# Patient Record
Sex: FEMALE | Race: OTHER | URBAN - METROPOLITAN AREA
[De-identification: names, ages, dates, MRNs, and addresses within clinical notes are randomized per-mention and may not be internally consistent; named-entity substitution may affect disease eponyms.]

---

## 2017-12-08 ENCOUNTER — EMERGENCY (EMERGENCY)
Facility: HOSPITAL | Age: 22
LOS: 0 days | Discharge: HOME | End: 2017-12-08
Attending: EMERGENCY MEDICINE
Payer: SELF-PAY

## 2017-12-08 VITALS
HEIGHT: 63 IN | WEIGHT: 139.99 LBS | DIASTOLIC BLOOD PRESSURE: 81 MMHG | OXYGEN SATURATION: 98 % | RESPIRATION RATE: 15 BRPM | HEART RATE: 99 BPM | TEMPERATURE: 99 F | SYSTOLIC BLOOD PRESSURE: 144 MMHG

## 2017-12-08 DIAGNOSIS — R50.9 FEVER, UNSPECIFIED: ICD-10-CM

## 2017-12-08 DIAGNOSIS — R09.81 NASAL CONGESTION: ICD-10-CM

## 2017-12-08 DIAGNOSIS — R05 COUGH: ICD-10-CM

## 2017-12-08 DIAGNOSIS — H66.92 OTITIS MEDIA, UNSPECIFIED, LEFT EAR: ICD-10-CM

## 2017-12-08 DIAGNOSIS — J06.9 ACUTE UPPER RESPIRATORY INFECTION, UNSPECIFIED: ICD-10-CM

## 2017-12-08 PROCEDURE — 99283 EMERGENCY DEPT VISIT LOW MDM: CPT

## 2017-12-08 RX ORDER — DEXTROMETHORPHAN HYDROBROMIDE, GUAIFENESIN, PHENYLEPHRINE HYDROCHLORIDE 17.5; 400; 1 MG/1; MG/1; MG/1
10 TABLET ORAL
Qty: 200 | Refills: 0 | OUTPATIENT
Start: 2017-12-08 | End: 2017-12-11

## 2017-12-08 RX ORDER — IBUPROFEN 200 MG
600 TABLET ORAL ONCE
Qty: 0 | Refills: 0 | Status: COMPLETED | OUTPATIENT
Start: 2017-12-08 | End: 2017-12-08

## 2017-12-08 RX ORDER — IBUPROFEN 200 MG
1 TABLET ORAL
Qty: 15 | Refills: 0 | OUTPATIENT
Start: 2017-12-08 | End: 2017-12-13

## 2017-12-08 RX ADMIN — Medication 600 MILLIGRAM(S): at 22:47

## 2017-12-08 RX ADMIN — Medication 1 TABLET(S): at 22:47

## 2017-12-08 NOTE — ED PROVIDER NOTE - OBJECTIVE STATEMENT
22 years old female walked in with her friend c/o clear sputum coughs, fever, chills, nasal congestions, sore throat, and left ear pain for 5 days. Pt denies headache, dizziness, sob, chest pain, nausea, vomiting, abd pain, dysuria, vaginal spotting or discharge or irregular bowel movements. Pt sts she is not at risk of being pregnant. Pt sts she did not take tylenol or motrin at home today.

## 2017-12-08 NOTE — ED PROVIDER NOTE - CONSTITUTIONAL, MLM
normal... Well appearing, well nourished, awake, alert, oriented to person, place, time/situation and in no apparent distress. Speaking in clear full sentences no nasal flaring no shoulders retractions no active coughing, appears very comfortable sitting up in the stretcher in a bright light room
